# Patient Record
Sex: MALE | Race: BLACK OR AFRICAN AMERICAN | URBAN - METROPOLITAN AREA
[De-identification: names, ages, dates, MRNs, and addresses within clinical notes are randomized per-mention and may not be internally consistent; named-entity substitution may affect disease eponyms.]

---

## 2018-05-17 ENCOUNTER — HISTORICAL (OUTPATIENT)
Dept: ADMINISTRATIVE | Facility: HOSPITAL | Age: 78
End: 2018-05-17

## 2018-05-17 LAB
ABS NEUT (OLG): 5.43 X10(3)/MCL (ref 2.1–9.2)
ANION GAP SERPL CALC-SCNC: 14 MMOL/L
BUN SERPL-MCNC: 13 MG/DL (ref 7–18)
CHLORIDE SERPL-SCNC: 99 MMOL/L (ref 98–109)
CREAT SERPL-MCNC: 0.8 MG/DL (ref 0.6–1.3)
EOSINOPHIL # BLD AUTO: 0.1 X10(3)/MCL (ref 0–0.9)
EOSINOPHIL NFR BLD AUTO: 0.8 %
ERYTHROCYTE [DISTWIDTH] IN BLOOD BY AUTOMATED COUNT: 19.1 % (ref 11.5–17)
GLUCOSE SERPL-MCNC: 84 MG/DL (ref 70–105)
HCT VFR BLD AUTO: 35.7 % (ref 42–52)
HCT VFR BLD CALC: 36 % (ref 38–51)
HGB BLD-MCNC: 11.2 GM/DL (ref 14–18)
HGB BLD-MCNC: 12.2 MG/DL (ref 12–17)
LYMPHOCYTES # BLD AUTO: 1.2 X10(3)/MCL (ref 0.6–4.6)
LYMPHOCYTES NFR BLD AUTO: 15.9 %
MCH RBC QN AUTO: 24.9 PG (ref 27–31)
MCHC RBC AUTO-ENTMCNC: 31.4 GM/DL (ref 33–36)
MCV RBC AUTO: 79.5 FL (ref 80–94)
MONOCYTES # BLD AUTO: 0.9 X10(3)/MCL (ref 0.1–1.3)
MONOCYTES NFR BLD AUTO: 11.8 %
NEUTROPHILS # BLD AUTO: 5.4 X10(3)/MCL (ref 2.1–9.2)
NEUTROPHILS NFR BLD AUTO: 71.5 %
PLATELET # BLD AUTO: 369 X10(3)/MCL (ref 130–400)
PMV BLD AUTO: 9.4 FL (ref 9.4–12.4)
POC IONIZED CALCIUM: 1.25 MMOL/L (ref 1.12–1.32)
POC TCO2: 28 MMOL/L (ref 22–27)
POTASSIUM BLD-SCNC: 4.8 MMOL/L (ref 3.5–4.9)
RBC # BLD AUTO: 4.49 X10(6)/MCL (ref 4.7–6.1)
SODIUM BLD-SCNC: 135 MMOL/L (ref 138–146)
WBC # SPEC AUTO: 7.6 X10(3)/MCL (ref 4.5–11.5)

## 2018-05-23 ENCOUNTER — HISTORICAL (OUTPATIENT)
Dept: INFUSION THERAPY | Facility: HOSPITAL | Age: 78
End: 2018-05-23

## 2018-05-31 ENCOUNTER — HISTORICAL (OUTPATIENT)
Dept: ADMINISTRATIVE | Facility: HOSPITAL | Age: 78
End: 2018-05-31

## 2018-05-31 LAB
ABS NEUT (OLG): 5.03 X10(3)/MCL (ref 2.1–9.2)
ANION GAP SERPL CALC-SCNC: 13 MMOL/L
BASOPHILS # BLD AUTO: 0 X10(3)/MCL (ref 0–0.2)
BASOPHILS NFR BLD AUTO: 0.2 %
BUN SERPL-MCNC: 20 MG/DL (ref 7–18)
CHLORIDE SERPL-SCNC: 99 MMOL/L (ref 98–109)
CREAT SERPL-MCNC: 0.8 MG/DL (ref 0.6–1.3)
EOSINOPHIL # BLD AUTO: 0 X10(3)/MCL (ref 0–0.9)
EOSINOPHIL NFR BLD AUTO: 0.8 %
ERYTHROCYTE [DISTWIDTH] IN BLOOD BY AUTOMATED COUNT: 19.9 % (ref 11.5–17)
GLUCOSE SERPL-MCNC: 100 MG/DL (ref 70–105)
HCT VFR BLD AUTO: 34.6 % (ref 42–52)
HCT VFR BLD CALC: 35 % (ref 38–51)
HGB BLD-MCNC: 10.9 GM/DL (ref 14–18)
HGB BLD-MCNC: 11.9 MG/DL (ref 12–17)
LYMPHOCYTES # BLD AUTO: 0.4 X10(3)/MCL (ref 0.6–4.6)
LYMPHOCYTES NFR BLD AUTO: 7.3 %
MCH RBC QN AUTO: 25.5 PG (ref 27–31)
MCHC RBC AUTO-ENTMCNC: 31.5 GM/DL (ref 33–36)
MCV RBC AUTO: 81 FL (ref 80–94)
MONOCYTES # BLD AUTO: 0.5 X10(3)/MCL (ref 0.1–1.3)
MONOCYTES NFR BLD AUTO: 8.1 %
NEUTROPHILS # BLD AUTO: 5 X10(3)/MCL (ref 2.1–9.2)
NEUTROPHILS NFR BLD AUTO: 83.6 %
PLATELET # BLD AUTO: 385 X10(3)/MCL (ref 130–400)
PMV BLD AUTO: 8.6 FL (ref 9.4–12.4)
POC IONIZED CALCIUM: 1.27 MMOL/L (ref 1.12–1.32)
POC TCO2: 31 MMOL/L (ref 22–27)
POTASSIUM BLD-SCNC: 4.9 MMOL/L (ref 3.5–4.9)
RBC # BLD AUTO: 4.27 X10(6)/MCL (ref 4.7–6.1)
SODIUM BLD-SCNC: 136 MMOL/L (ref 138–146)
WBC # SPEC AUTO: 6 X10(3)/MCL (ref 4.5–11.5)

## 2018-06-01 ENCOUNTER — HISTORICAL (OUTPATIENT)
Dept: INFUSION THERAPY | Facility: HOSPITAL | Age: 78
End: 2018-06-01

## 2018-06-08 ENCOUNTER — HISTORICAL (OUTPATIENT)
Dept: INFUSION THERAPY | Facility: HOSPITAL | Age: 78
End: 2018-06-08

## 2018-06-08 LAB
ABS NEUT (OLG): 2.42 X10(3)/MCL (ref 2.1–9.2)
ALBUMIN SERPL-MCNC: 3.4 GM/DL (ref 3.4–5)
ALP SERPL-CCNC: 134 UNIT/L (ref 50–136)
ALT SERPL-CCNC: 27 UNIT/L (ref 12–78)
ANION GAP SERPL CALC-SCNC: 12 MMOL/L
AST SERPL-CCNC: 18 UNIT/L (ref 15–37)
BASOPHILS # BLD AUTO: 0 X10(3)/MCL (ref 0–0.2)
BASOPHILS NFR BLD AUTO: 0.3 %
BILIRUB SERPL-MCNC: 0.2 MG/DL (ref 0.2–1)
BILIRUBIN DIRECT+TOT PNL SERPL-MCNC: 0.1 MG/DL (ref 0–0.5)
BILIRUBIN DIRECT+TOT PNL SERPL-MCNC: 0.1 MG/DL (ref 0–0.8)
BUN SERPL-MCNC: 17 MG/DL (ref 7–18)
CHLORIDE SERPL-SCNC: 96 MMOL/L (ref 98–109)
CREAT SERPL-MCNC: 0.8 MG/DL (ref 0.6–1.3)
EOSINOPHIL # BLD AUTO: 0 X10(3)/MCL (ref 0–0.9)
EOSINOPHIL NFR BLD AUTO: 0.7 %
ERYTHROCYTE [DISTWIDTH] IN BLOOD BY AUTOMATED COUNT: 20.4 % (ref 11.5–17)
GLUCOSE SERPL-MCNC: 106 MG/DL (ref 70–105)
HCT VFR BLD AUTO: 34.4 % (ref 42–52)
HCT VFR BLD CALC: 35 % (ref 38–51)
HGB BLD-MCNC: 10.9 GM/DL (ref 14–18)
HGB BLD-MCNC: 11.9 MG/DL (ref 12–17)
LIVER PROFILE INTERP: NORMAL
LYMPHOCYTES # BLD AUTO: 0.2 X10(3)/MCL (ref 0.6–4.6)
LYMPHOCYTES NFR BLD AUTO: 5.5 %
MCH RBC QN AUTO: 25.5 PG (ref 27–31)
MCHC RBC AUTO-ENTMCNC: 31.7 GM/DL (ref 33–36)
MCV RBC AUTO: 80.4 FL (ref 80–94)
MONOCYTES # BLD AUTO: 0.3 X10(3)/MCL (ref 0.1–1.3)
MONOCYTES NFR BLD AUTO: 10.9 %
NEUTROPHILS # BLD AUTO: 2.4 X10(3)/MCL (ref 2.1–9.2)
NEUTROPHILS NFR BLD AUTO: 82.6 %
PLATELET # BLD AUTO: 239 X10(3)/MCL (ref 130–400)
PMV BLD AUTO: 9 FL (ref 9.4–12.4)
POC IONIZED CALCIUM: 1.22 MMOL/L (ref 1.12–1.32)
POC TCO2: 32 MMOL/L (ref 22–27)
POTASSIUM BLD-SCNC: 4.3 MMOL/L (ref 3.5–4.9)
PROT SERPL-MCNC: 7.6 GM/DL (ref 6.4–8.2)
RBC # BLD AUTO: 4.28 X10(6)/MCL (ref 4.7–6.1)
SODIUM BLD-SCNC: 135 MMOL/L (ref 138–146)
WBC # SPEC AUTO: 2.9 X10(3)/MCL (ref 4.5–11.5)

## 2018-06-09 ENCOUNTER — HISTORICAL (OUTPATIENT)
Dept: INFUSION THERAPY | Facility: HOSPITAL | Age: 78
End: 2018-06-09

## 2018-06-10 ENCOUNTER — HISTORICAL (OUTPATIENT)
Dept: INFUSION THERAPY | Facility: HOSPITAL | Age: 78
End: 2018-06-10

## 2018-06-14 ENCOUNTER — HISTORICAL (OUTPATIENT)
Dept: ADMINISTRATIVE | Facility: HOSPITAL | Age: 78
End: 2018-06-14

## 2018-06-14 LAB
ABS NEUT (OLG): 2.63 X10(3)/MCL (ref 2.1–9.2)
ANION GAP SERPL CALC-SCNC: 16 MMOL/L
BUN SERPL-MCNC: 12 MG/DL (ref 7–18)
CHLORIDE SERPL-SCNC: 96 MMOL/L (ref 98–109)
CREAT SERPL-MCNC: 0.8 MG/DL (ref 0.6–1.3)
EOSINOPHIL # BLD AUTO: 0 X10(3)/MCL (ref 0–0.9)
EOSINOPHIL NFR BLD AUTO: 0.3 %
ERYTHROCYTE [DISTWIDTH] IN BLOOD BY AUTOMATED COUNT: 21.5 % (ref 11.5–17)
GLUCOSE SERPL-MCNC: 109 MG/DL (ref 70–105)
HCT VFR BLD AUTO: 34.8 % (ref 42–52)
HCT VFR BLD CALC: 36 % (ref 38–51)
HGB BLD-MCNC: 11.2 GM/DL (ref 14–18)
HGB BLD-MCNC: 12.2 MG/DL (ref 12–17)
LYMPHOCYTES # BLD AUTO: 0.2 X10(3)/MCL (ref 0.6–4.6)
LYMPHOCYTES NFR BLD AUTO: 5.5 %
MCH RBC QN AUTO: 25.7 PG (ref 27–31)
MCHC RBC AUTO-ENTMCNC: 32.2 GM/DL (ref 33–36)
MCV RBC AUTO: 80 FL (ref 80–94)
MONOCYTES # BLD AUTO: 0.4 X10(3)/MCL (ref 0.1–1.3)
MONOCYTES NFR BLD AUTO: 13.8 %
NEUTROPHILS # BLD AUTO: 2.6 X10(3)/MCL (ref 2.1–9.2)
NEUTROPHILS NFR BLD AUTO: 80.4 %
PLATELET # BLD AUTO: 228 X10(3)/MCL (ref 130–400)
PMV BLD AUTO: 9 FL (ref 9.4–12.4)
POC IONIZED CALCIUM: 1.28 MMOL/L (ref 1.12–1.32)
POC TCO2: 27 MMOL/L (ref 22–27)
POTASSIUM BLD-SCNC: 4.3 MMOL/L (ref 3.5–4.9)
RBC # BLD AUTO: 4.35 X10(6)/MCL (ref 4.7–6.1)
SODIUM BLD-SCNC: 134 MMOL/L (ref 138–146)
WBC # SPEC AUTO: 3.3 X10(3)/MCL (ref 4.5–11.5)

## 2018-06-15 ENCOUNTER — HISTORICAL (OUTPATIENT)
Dept: INFUSION THERAPY | Facility: HOSPITAL | Age: 78
End: 2018-06-15

## 2018-06-16 ENCOUNTER — HISTORICAL (OUTPATIENT)
Dept: INFUSION THERAPY | Facility: HOSPITAL | Age: 78
End: 2018-06-16

## 2018-06-17 ENCOUNTER — HISTORICAL (OUTPATIENT)
Dept: INFUSION THERAPY | Facility: HOSPITAL | Age: 78
End: 2018-06-17

## 2018-06-22 ENCOUNTER — HISTORICAL (OUTPATIENT)
Dept: ADMINISTRATIVE | Facility: HOSPITAL | Age: 78
End: 2018-06-22

## 2018-06-22 LAB
ABS NEUT (OLG): 2 X10(3)/MCL (ref 2.1–9.2)
ALBUMIN SERPL-MCNC: 3.1 GM/DL (ref 3.4–5)
ALP SERPL-CCNC: 102 UNIT/L (ref 50–136)
ALT SERPL-CCNC: 21 UNIT/L (ref 12–78)
ANION GAP SERPL CALC-SCNC: 14 MMOL/L
AST SERPL-CCNC: 14 UNIT/L (ref 15–37)
BASOPHILS # BLD AUTO: 0 X10(3)/MCL (ref 0–0.2)
BASOPHILS NFR BLD AUTO: 0.4 %
BILIRUB SERPL-MCNC: 0.2 MG/DL (ref 0.2–1)
BILIRUBIN DIRECT+TOT PNL SERPL-MCNC: 0.1 MG/DL (ref 0–0.5)
BILIRUBIN DIRECT+TOT PNL SERPL-MCNC: 0.1 MG/DL (ref 0–0.8)
BUN SERPL-MCNC: 11 MG/DL (ref 7–18)
CHLORIDE SERPL-SCNC: 97 MMOL/L (ref 98–109)
CREAT SERPL-MCNC: 0.7 MG/DL (ref 0.6–1.3)
EOSINOPHIL # BLD AUTO: 0 X10(3)/MCL (ref 0–0.9)
EOSINOPHIL NFR BLD AUTO: 0.4 %
ERYTHROCYTE [DISTWIDTH] IN BLOOD BY AUTOMATED COUNT: 22.2 % (ref 11.5–17)
GLUCOSE SERPL-MCNC: 75 MG/DL (ref 70–105)
HCT VFR BLD AUTO: 32 % (ref 42–52)
HCT VFR BLD CALC: 32 % (ref 38–51)
HGB BLD-MCNC: 10.2 GM/DL (ref 14–18)
HGB BLD-MCNC: 10.9 MG/DL (ref 12–17)
LIVER PROFILE INTERP: ABNORMAL
LYMPHOCYTES # BLD AUTO: 0.2 X10(3)/MCL (ref 0.6–4.6)
LYMPHOCYTES NFR BLD AUTO: 6.6 %
MCH RBC QN AUTO: 25.8 PG (ref 27–31)
MCHC RBC AUTO-ENTMCNC: 31.9 GM/DL (ref 33–36)
MCV RBC AUTO: 81 FL (ref 80–94)
MONOCYTES # BLD AUTO: 0.5 X10(3)/MCL (ref 0.1–1.3)
MONOCYTES NFR BLD AUTO: 19.7 %
NEUTROPHILS # BLD AUTO: 2 X10(3)/MCL (ref 2.1–9.2)
NEUTROPHILS NFR BLD AUTO: 72.9 %
PLATELET # BLD AUTO: 279 X10(3)/MCL (ref 130–400)
PMV BLD AUTO: 9.4 FL (ref 9.4–12.4)
POC IONIZED CALCIUM: 1.25 MMOL/L (ref 1.12–1.32)
POC TCO2: 28 MMOL/L (ref 22–27)
POTASSIUM BLD-SCNC: 4.5 MMOL/L (ref 3.5–4.9)
PROT SERPL-MCNC: 7 GM/DL (ref 6.4–8.2)
RBC # BLD AUTO: 3.95 X10(6)/MCL (ref 4.7–6.1)
SODIUM BLD-SCNC: 134 MMOL/L (ref 138–146)
WBC # SPEC AUTO: 2.7 X10(3)/MCL (ref 4.5–11.5)

## 2018-06-23 ENCOUNTER — HISTORICAL (OUTPATIENT)
Dept: INFUSION THERAPY | Facility: HOSPITAL | Age: 78
End: 2018-06-23

## 2018-06-24 ENCOUNTER — HISTORICAL (OUTPATIENT)
Dept: INFUSION THERAPY | Facility: HOSPITAL | Age: 78
End: 2018-06-24

## 2018-06-28 ENCOUNTER — HISTORICAL (OUTPATIENT)
Dept: ADMINISTRATIVE | Facility: HOSPITAL | Age: 78
End: 2018-06-28

## 2018-06-28 LAB
ABS NEUT (OLG): 1.37 X10(3)/MCL (ref 2.1–9.2)
ALBUMIN SERPL-MCNC: 3 GM/DL (ref 3.4–5)
ALP SERPL-CCNC: 107 UNIT/L (ref 50–136)
ALT SERPL-CCNC: 21 UNIT/L (ref 12–78)
ANION GAP SERPL CALC-SCNC: 14 MMOL/L
AST SERPL-CCNC: 14 UNIT/L (ref 15–37)
BASOPHILS # BLD AUTO: 0 X10(3)/MCL (ref 0–0.2)
BASOPHILS NFR BLD AUTO: 0.5 %
BILIRUB SERPL-MCNC: 0.3 MG/DL (ref 0.2–1)
BILIRUBIN DIRECT+TOT PNL SERPL-MCNC: 0.1 MG/DL (ref 0–0.2)
BILIRUBIN DIRECT+TOT PNL SERPL-MCNC: 0.2 MG/DL (ref 0–0.8)
BUN SERPL-MCNC: 13 MG/DL (ref 8–26)
CHLORIDE SERPL-SCNC: 101 MMOL/L (ref 98–109)
CREAT SERPL-MCNC: 0.7 MG/DL (ref 0.6–1.3)
EOSINOPHIL # BLD AUTO: 0 X10(3)/MCL (ref 0–0.9)
EOSINOPHIL NFR BLD AUTO: 0.5 %
ERYTHROCYTE [DISTWIDTH] IN BLOOD BY AUTOMATED COUNT: 23.3 % (ref 11.5–17)
GLUCOSE SERPL-MCNC: 81 MG/DL (ref 70–105)
HCT VFR BLD AUTO: 32.3 % (ref 42–52)
HCT VFR BLD CALC: 31 % (ref 38–51)
HGB BLD-MCNC: 10.2 GM/DL (ref 14–18)
HGB BLD-MCNC: 10.5 MG/DL (ref 12–17)
LIVER PROFILE INTERP: ABNORMAL
LYMPHOCYTES # BLD AUTO: 0.2 X10(3)/MCL (ref 0.6–4.6)
LYMPHOCYTES NFR BLD AUTO: 7.7 %
LYMPHOCYTES NFR BLD MANUAL: 8 % (ref 13–40)
MCH RBC QN AUTO: 25.7 PG (ref 27–31)
MCHC RBC AUTO-ENTMCNC: 31.6 GM/DL (ref 33–36)
MCV RBC AUTO: 81.4 FL (ref 80–94)
METAMYELOCYTES NFR BLD MANUAL: 4 %
MONOCYTES # BLD AUTO: 0.6 X10(3)/MCL (ref 0.1–1.3)
MONOCYTES NFR BLD AUTO: 29.4 %
MONOCYTES NFR BLD MANUAL: 7 % (ref 2–11)
MYELOCYTES NFR BLD MANUAL: 1 %
NEUTROPHILS # BLD AUTO: 1.4 X10(3)/MCL (ref 2.1–9.2)
NEUTROPHILS NFR BLD AUTO: 61.9 %
NEUTROPHILS NFR BLD MANUAL: 80 % (ref 47–80)
PLATELET # BLD AUTO: 284 X10(3)/MCL (ref 130–400)
PMV BLD AUTO: 8.7 FL (ref 9.4–12.4)
POC IONIZED CALCIUM: 1.24 MMOL/L (ref 1.12–1.32)
POC TCO2: 28 MMOL/L (ref 24–29)
POTASSIUM BLD-SCNC: 5.6 MMOL/L (ref 3.5–4.9)
PROT SERPL-MCNC: 7.1 GM/DL (ref 6.4–8.2)
RBC # BLD AUTO: 3.97 X10(6)/MCL (ref 4.7–6.1)
SODIUM BLD-SCNC: 136 MMOL/L (ref 138–146)
WBC # SPEC AUTO: 2.2 X10(3)/MCL (ref 4.5–11.5)

## 2018-06-29 ENCOUNTER — HISTORICAL (OUTPATIENT)
Dept: INFUSION THERAPY | Facility: HOSPITAL | Age: 78
End: 2018-06-29

## 2018-06-30 ENCOUNTER — HISTORICAL (OUTPATIENT)
Dept: INFUSION THERAPY | Facility: HOSPITAL | Age: 78
End: 2018-06-30

## 2018-07-01 ENCOUNTER — HISTORICAL (OUTPATIENT)
Dept: INFUSION THERAPY | Facility: HOSPITAL | Age: 78
End: 2018-07-01

## 2018-07-02 ENCOUNTER — HISTORICAL (OUTPATIENT)
Dept: INFUSION THERAPY | Facility: HOSPITAL | Age: 78
End: 2018-07-02

## 2018-07-02 LAB
ABS NEUT (OLG): 8.07 X10(3)/MCL (ref 2.1–9.2)
ALBUMIN SERPL-MCNC: 2.6 GM/DL (ref 3.4–5)
ALBUMIN/GLOB SERPL: 0.8 {RATIO}
ALP SERPL-CCNC: 95 UNIT/L (ref 50–136)
ALT SERPL-CCNC: 15 UNIT/L (ref 12–78)
AST SERPL-CCNC: 11 UNIT/L (ref 15–37)
BASOPHILS # BLD AUTO: 0 X10(3)/MCL (ref 0–0.2)
BASOPHILS NFR BLD AUTO: 0.1 %
BILIRUB SERPL-MCNC: 0.4 MG/DL (ref 0.2–1)
BILIRUBIN DIRECT+TOT PNL SERPL-MCNC: 0.1 MG/DL (ref 0–0.2)
BILIRUBIN DIRECT+TOT PNL SERPL-MCNC: 0.3 MG/DL (ref 0–0.8)
BUN SERPL-MCNC: 9 MG/DL (ref 7–18)
CALCIUM SERPL-MCNC: 8.1 MG/DL (ref 8.5–10.1)
CHLORIDE SERPL-SCNC: 100 MMOL/L (ref 98–107)
CO2 SERPL-SCNC: 26 MMOL/L (ref 21–32)
CREAT SERPL-MCNC: 0.55 MG/DL (ref 0.7–1.3)
ERYTHROCYTE [DISTWIDTH] IN BLOOD BY AUTOMATED COUNT: 24.1 % (ref 11.5–17)
GLOBULIN SER-MCNC: 3.4 GM/DL (ref 2.4–3.5)
GLUCOSE SERPL-MCNC: 72 MG/DL (ref 74–106)
HCT VFR BLD AUTO: 28.6 % (ref 42–52)
HGB BLD-MCNC: 9.1 GM/DL (ref 14–18)
LYMPHOCYTES # BLD AUTO: 0.2 X10(3)/MCL (ref 0.6–4.6)
LYMPHOCYTES NFR BLD AUTO: 2.8 %
MCH RBC QN AUTO: 26.1 PG (ref 27–31)
MCHC RBC AUTO-ENTMCNC: 31.8 GM/DL (ref 33–36)
MCV RBC AUTO: 81.9 FL (ref 80–94)
MONOCYTES # BLD AUTO: 0.3 X10(3)/MCL (ref 0.1–1.3)
MONOCYTES NFR BLD AUTO: 3.9 %
NEUTROPHILS # BLD AUTO: 8.1 X10(3)/MCL (ref 2.1–9.2)
NEUTROPHILS NFR BLD AUTO: 93.2 %
PLATELET # BLD AUTO: 291 X10(3)/MCL (ref 130–400)
PMV BLD AUTO: 9.1 FL (ref 9.4–12.4)
POTASSIUM SERPL-SCNC: 4.5 MMOL/L (ref 3.5–5.1)
PROT SERPL-MCNC: 6 GM/DL (ref 6.4–8.2)
RBC # BLD AUTO: 3.49 X10(6)/MCL (ref 4.7–6.1)
SODIUM SERPL-SCNC: 134 MMOL/L (ref 136–145)
WBC # SPEC AUTO: 8.7 X10(3)/MCL (ref 4.5–11.5)

## 2018-07-05 ENCOUNTER — HISTORICAL (OUTPATIENT)
Dept: ADMINISTRATIVE | Facility: HOSPITAL | Age: 78
End: 2018-07-05

## 2018-07-05 LAB
ABS NEUT (OLG): 1.81 X10(3)/MCL (ref 2.1–9.2)
ANION GAP SERPL CALC-SCNC: 20 MMOL/L
ANISOCYTOSIS BLD QL SMEAR: 1
BASOPHILS # BLD AUTO: 0 X10(3)/MCL (ref 0–0.2)
BASOPHILS NFR BLD AUTO: 0.3 %
BUN SERPL-MCNC: 10 MG/DL (ref 8–26)
CHLORIDE SERPL-SCNC: 98 MMOL/L (ref 98–109)
CREAT SERPL-MCNC: 0.6 MG/DL (ref 0.6–1.3)
EOSINOPHIL # BLD AUTO: 0 X10(3)/MCL (ref 0–0.9)
EOSINOPHIL NFR BLD AUTO: 0.3 %
ERYTHROCYTE [DISTWIDTH] IN BLOOD BY AUTOMATED COUNT: 24.6 % (ref 11.5–17)
GLUCOSE SERPL-MCNC: 93 MG/DL (ref 70–105)
HCT VFR BLD AUTO: 32.2 % (ref 42–52)
HCT VFR BLD CALC: 30 % (ref 38–51)
HGB BLD-MCNC: 10.2 MG/DL (ref 12–17)
HGB BLD-MCNC: 10.4 GM/DL (ref 14–18)
HYPOCHROMIA BLD QL SMEAR: 0
LYMPHOCYTES # BLD AUTO: 0.4 X10(3)/MCL (ref 0.6–4.6)
LYMPHOCYTES NFR BLD AUTO: 13.7 %
LYMPHOCYTES NFR BLD MANUAL: 17 % (ref 13–40)
MACROCYTES BLD QL SMEAR: 1
MCH RBC QN AUTO: 26.5 PG (ref 27–31)
MCHC RBC AUTO-ENTMCNC: 32.3 GM/DL (ref 33–36)
MCV RBC AUTO: 81.9 FL (ref 80–94)
MICROCYTES BLD QL SMEAR: 0
MONOCYTES # BLD AUTO: 0.7 X10(3)/MCL (ref 0.1–1.3)
MONOCYTES NFR BLD AUTO: 23.4 %
MONOCYTES NFR BLD MANUAL: 14 % (ref 2–11)
NEUTROPHILS # BLD AUTO: 1.8 X10(3)/MCL (ref 2.1–9.2)
NEUTROPHILS NFR BLD AUTO: 62.3 %
NEUTROPHILS NFR BLD MANUAL: 69 % (ref 47–80)
PLATELET # BLD AUTO: 305 X10(3)/MCL (ref 130–400)
PLATELET # BLD EST: NORMAL 10*3/UL
PMV BLD AUTO: 9.1 FL (ref 9.4–12.4)
POC IONIZED CALCIUM: 1.2 MMOL/L (ref 1.12–1.32)
POC TCO2: 25 MMOL/L (ref 24–29)
POIKILOCYTOSIS BLD QL SMEAR: 1
POLYCHROMASIA BLD QL SMEAR: 0
POTASSIUM BLD-SCNC: 3.8 MMOL/L (ref 3.5–4.9)
RBC # BLD AUTO: 3.93 X10(6)/MCL (ref 4.7–6.1)
SODIUM BLD-SCNC: 137 MMOL/L (ref 138–146)
WBC # SPEC AUTO: 2.9 X10(3)/MCL (ref 4.5–11.5)

## 2018-07-06 ENCOUNTER — HISTORICAL (OUTPATIENT)
Dept: INFUSION THERAPY | Facility: HOSPITAL | Age: 78
End: 2018-07-06

## 2018-07-07 ENCOUNTER — HISTORICAL (OUTPATIENT)
Dept: INFUSION THERAPY | Facility: HOSPITAL | Age: 78
End: 2018-07-07

## 2018-07-10 ENCOUNTER — HISTORICAL (OUTPATIENT)
Dept: INFUSION THERAPY | Facility: HOSPITAL | Age: 78
End: 2018-07-10

## 2018-07-12 ENCOUNTER — HISTORICAL (OUTPATIENT)
Dept: INFUSION THERAPY | Facility: HOSPITAL | Age: 78
End: 2018-07-12

## 2018-07-12 LAB
ABS NEUT (OLG): 2.19 X10(3)/MCL (ref 2.1–9.2)
ALBUMIN SERPL-MCNC: 2.9 GM/DL (ref 3.4–5)
ALBUMIN/GLOB SERPL: 0.7 RATIO (ref 1.1–2)
ALP SERPL-CCNC: 104 UNIT/L (ref 50–136)
ALT SERPL-CCNC: 16 UNIT/L (ref 12–78)
AST SERPL-CCNC: 14 UNIT/L (ref 15–37)
BASOPHILS # BLD AUTO: 0 X10(3)/MCL (ref 0–0.2)
BASOPHILS NFR BLD AUTO: 0.2 %
BILIRUB SERPL-MCNC: 0.2 MG/DL (ref 0.2–1)
BILIRUBIN DIRECT+TOT PNL SERPL-MCNC: 0.1 MG/DL (ref 0–0.5)
BILIRUBIN DIRECT+TOT PNL SERPL-MCNC: 0.1 MG/DL (ref 0–0.8)
BUN SERPL-MCNC: 13 MG/DL (ref 7–18)
CALCIUM SERPL-MCNC: 9.1 MG/DL (ref 8.5–10.1)
CHLORIDE SERPL-SCNC: 101 MMOL/L (ref 98–107)
CO2 SERPL-SCNC: 29 MMOL/L (ref 21–32)
CREAT SERPL-MCNC: 0.8 MG/DL (ref 0.7–1.3)
EOSINOPHIL # BLD AUTO: 0 X10(3)/MCL (ref 0–0.9)
EOSINOPHIL NFR BLD AUTO: 0.2 %
ERYTHROCYTE [DISTWIDTH] IN BLOOD BY AUTOMATED COUNT: 25.8 % (ref 11.5–17)
GLOBULIN SER-MCNC: 3.9 GM/DL (ref 2.4–3.5)
GLUCOSE SERPL-MCNC: 102 MG/DL (ref 74–106)
HCT VFR BLD AUTO: 33.9 % (ref 42–52)
HGB BLD-MCNC: 10.8 GM/DL (ref 14–18)
LYMPHOCYTES # BLD AUTO: 1 X10(3)/MCL (ref 0.6–4.6)
LYMPHOCYTES NFR BLD AUTO: 24.7 %
MCH RBC QN AUTO: 25.8 PG (ref 27–31)
MCHC RBC AUTO-ENTMCNC: 31.9 GM/DL (ref 33–36)
MCV RBC AUTO: 81.1 FL (ref 80–94)
MONOCYTES # BLD AUTO: 0.9 X10(3)/MCL (ref 0.1–1.3)
MONOCYTES NFR BLD AUTO: 22.3 %
NEUTROPHILS # BLD AUTO: 2.2 X10(3)/MCL (ref 2.1–9.2)
NEUTROPHILS NFR BLD AUTO: 52.6 %
PLATELET # BLD AUTO: 306 X10(3)/MCL (ref 130–400)
PMV BLD AUTO: 8.7 FL (ref 9.4–12.4)
POTASSIUM SERPL-SCNC: 4.2 MMOL/L (ref 3.5–5.1)
PROT SERPL-MCNC: 6.8 GM/DL (ref 6.4–8.2)
RBC # BLD AUTO: 4.18 X10(6)/MCL (ref 4.7–6.1)
SODIUM SERPL-SCNC: 138 MMOL/L (ref 136–145)
WBC # SPEC AUTO: 4.2 X10(3)/MCL (ref 4.5–11.5)

## 2018-07-18 ENCOUNTER — HISTORICAL (OUTPATIENT)
Dept: INFUSION THERAPY | Facility: HOSPITAL | Age: 78
End: 2018-07-18

## 2018-07-18 LAB
ABS NEUT (OLG): 2.96 X10(3)/MCL (ref 2.1–9.2)
ALBUMIN SERPL-MCNC: 2.9 GM/DL (ref 3.4–5)
ALBUMIN/GLOB SERPL: 0.7 {RATIO}
ALP SERPL-CCNC: 95 UNIT/L (ref 50–136)
ALT SERPL-CCNC: 19 UNIT/L (ref 12–78)
AST SERPL-CCNC: 22 UNIT/L (ref 15–37)
BASOPHILS # BLD AUTO: 0 X10(3)/MCL (ref 0–0.2)
BASOPHILS NFR BLD AUTO: 0.2 %
BILIRUB SERPL-MCNC: 0.3 MG/DL (ref 0.2–1)
BILIRUBIN DIRECT+TOT PNL SERPL-MCNC: 0.1 MG/DL (ref 0–0.2)
BILIRUBIN DIRECT+TOT PNL SERPL-MCNC: 0.2 MG/DL (ref 0–0.8)
BUN SERPL-MCNC: 14 MG/DL (ref 7–18)
CALCIUM SERPL-MCNC: 8.8 MG/DL (ref 8.5–10.1)
CHLORIDE SERPL-SCNC: 103 MMOL/L (ref 98–107)
CO2 SERPL-SCNC: 29 MMOL/L (ref 21–32)
CREAT SERPL-MCNC: 0.7 MG/DL (ref 0.7–1.3)
EOSINOPHIL # BLD AUTO: 0 X10(3)/MCL (ref 0–0.9)
EOSINOPHIL NFR BLD AUTO: 0.2 %
ERYTHROCYTE [DISTWIDTH] IN BLOOD BY AUTOMATED COUNT: 26 % (ref 11.5–17)
GLOBULIN SER-MCNC: 4.1 GM/DL (ref 2.4–3.5)
GLUCOSE SERPL-MCNC: 84 MG/DL (ref 74–106)
HCT VFR BLD AUTO: 31.6 % (ref 42–52)
HGB BLD-MCNC: 10.1 GM/DL (ref 14–18)
LYMPHOCYTES # BLD AUTO: 1.4 X10(3)/MCL (ref 0.6–4.6)
LYMPHOCYTES NFR BLD AUTO: 28 %
MCH RBC QN AUTO: 26 PG (ref 27–31)
MCHC RBC AUTO-ENTMCNC: 32 GM/DL (ref 33–36)
MCV RBC AUTO: 81.4 FL (ref 80–94)
MONOCYTES # BLD AUTO: 0.6 X10(3)/MCL (ref 0.1–1.3)
MONOCYTES NFR BLD AUTO: 12.9 %
NEUTROPHILS # BLD AUTO: 3 X10(3)/MCL (ref 2.1–9.2)
NEUTROPHILS NFR BLD AUTO: 58.7 %
PLATELET # BLD AUTO: 262 X10(3)/MCL (ref 130–400)
PMV BLD AUTO: 7.8 FL (ref 9.4–12.4)
POTASSIUM SERPL-SCNC: 4.5 MMOL/L (ref 3.5–5.1)
PROT SERPL-MCNC: 7 GM/DL (ref 6.4–8.2)
RBC # BLD AUTO: 3.88 X10(6)/MCL (ref 4.7–6.1)
SODIUM SERPL-SCNC: 139 MMOL/L (ref 136–145)
WBC # SPEC AUTO: 5 X10(3)/MCL (ref 4.5–11.5)

## 2018-07-26 ENCOUNTER — HISTORICAL (OUTPATIENT)
Dept: ADMINISTRATIVE | Facility: HOSPITAL | Age: 78
End: 2018-07-26

## 2018-07-26 LAB
ABS NEUT (OLG): 2.13 X10(3)/MCL (ref 2.1–9.2)
ANION GAP SERPL CALC-SCNC: 15 MMOL/L
BASOPHILS # BLD AUTO: 0 X10(3)/MCL (ref 0–0.2)
BASOPHILS NFR BLD AUTO: 0.2 %
BUN SERPL-MCNC: 15 MG/DL (ref 8–26)
CHLORIDE SERPL-SCNC: 99 MMOL/L (ref 98–109)
CREAT SERPL-MCNC: 0.8 MG/DL (ref 0.6–1.3)
EOSINOPHIL # BLD AUTO: 0.1 X10(3)/MCL (ref 0–0.9)
EOSINOPHIL NFR BLD AUTO: 1.9 %
ERYTHROCYTE [DISTWIDTH] IN BLOOD BY AUTOMATED COUNT: 25.2 % (ref 11.5–17)
GLUCOSE SERPL-MCNC: 92 MG/DL (ref 70–105)
HCT VFR BLD AUTO: 31.7 % (ref 42–52)
HCT VFR BLD CALC: 32 % (ref 38–51)
HGB BLD-MCNC: 10 GM/DL (ref 14–18)
HGB BLD-MCNC: 10.9 MG/DL (ref 12–17)
LYMPHOCYTES # BLD AUTO: 1.3 X10(3)/MCL (ref 0.6–4.6)
LYMPHOCYTES NFR BLD AUTO: 30.1 %
MCH RBC QN AUTO: 26 PG (ref 27–31)
MCHC RBC AUTO-ENTMCNC: 31.5 GM/DL (ref 33–36)
MCV RBC AUTO: 82.3 FL (ref 80–94)
MONOCYTES # BLD AUTO: 0.8 X10(3)/MCL (ref 0.1–1.3)
MONOCYTES NFR BLD AUTO: 17.6 %
NEUTROPHILS # BLD AUTO: 2.1 X10(3)/MCL (ref 2.1–9.2)
NEUTROPHILS NFR BLD AUTO: 50.2 %
PLATELET # BLD AUTO: 246 X10(3)/MCL (ref 130–400)
PMV BLD AUTO: 8.2 FL (ref 9.4–12.4)
POC IONIZED CALCIUM: 1.21 MMOL/L (ref 1.12–1.32)
POC TCO2: 29 MMOL/L (ref 24–29)
POTASSIUM BLD-SCNC: 3.8 MMOL/L (ref 3.5–4.9)
RBC # BLD AUTO: 3.85 X10(6)/MCL (ref 4.7–6.1)
SODIUM BLD-SCNC: 138 MMOL/L (ref 138–146)
WBC # SPEC AUTO: 4.2 X10(3)/MCL (ref 4.5–11.5)

## 2018-08-20 ENCOUNTER — HISTORICAL (OUTPATIENT)
Dept: HEMATOLOGY/ONCOLOGY | Facility: CLINIC | Age: 78
End: 2018-08-20

## 2018-08-20 LAB
ABS NEUT (OLG): 2.5 X10(3)/MCL (ref 2.1–9.2)
ALBUMIN SERPL-MCNC: 2.3 GM/DL (ref 3.4–5)
ALBUMIN/GLOB SERPL: 0.5 {RATIO}
ALP SERPL-CCNC: 94 UNIT/L (ref 50–136)
ALT SERPL-CCNC: 19 UNIT/L (ref 12–78)
AST SERPL-CCNC: 15 UNIT/L (ref 15–37)
BILIRUB SERPL-MCNC: 0.2 MG/DL (ref 0.2–1)
BILIRUBIN DIRECT+TOT PNL SERPL-MCNC: 0.1 MG/DL (ref 0–0.2)
BILIRUBIN DIRECT+TOT PNL SERPL-MCNC: 0.1 MG/DL (ref 0–0.8)
BUN SERPL-MCNC: 20 MG/DL (ref 7–18)
CALCIUM SERPL-MCNC: 8.7 MG/DL (ref 8.5–10.1)
CHLORIDE SERPL-SCNC: 106 MMOL/L (ref 98–107)
CO2 SERPL-SCNC: 28 MMOL/L (ref 21–32)
CREAT SERPL-MCNC: 0.72 MG/DL (ref 0.7–1.3)
EOSINOPHIL # BLD AUTO: 0.2 X10(3)/MCL (ref 0–0.9)
EOSINOPHIL NFR BLD AUTO: 5.4 %
ERYTHROCYTE [DISTWIDTH] IN BLOOD BY AUTOMATED COUNT: 22.2 % (ref 11.5–17)
GLOBULIN SER-MCNC: 4.5 GM/DL (ref 2.4–3.5)
GLUCOSE SERPL-MCNC: 107 MG/DL (ref 74–106)
HCT VFR BLD AUTO: 28.8 % (ref 42–52)
HGB BLD-MCNC: 8.9 GM/DL (ref 14–18)
LYMPHOCYTES # BLD AUTO: 0.7 X10(3)/MCL (ref 0.6–4.6)
LYMPHOCYTES NFR BLD AUTO: 17.7 %
MCH RBC QN AUTO: 25.5 PG (ref 27–31)
MCHC RBC AUTO-ENTMCNC: 30.9 GM/DL (ref 33–36)
MCV RBC AUTO: 82.5 FL (ref 80–94)
MONOCYTES # BLD AUTO: 0.5 X10(3)/MCL (ref 0.1–1.3)
MONOCYTES NFR BLD AUTO: 12.6 %
NEUTROPHILS # BLD AUTO: 2.5 X10(3)/MCL (ref 2.1–9.2)
NEUTROPHILS NFR BLD AUTO: 64.3 %
PLATELET # BLD AUTO: 492 X10(3)/MCL (ref 130–400)
PMV BLD AUTO: 8.4 FL (ref 9.4–12.4)
POTASSIUM SERPL-SCNC: 4.2 MMOL/L (ref 3.5–5.1)
PROT SERPL-MCNC: 6.8 GM/DL (ref 6.4–8.2)
RBC # BLD AUTO: 3.49 X10(6)/MCL (ref 4.7–6.1)
SODIUM SERPL-SCNC: 139 MMOL/L (ref 136–145)
WBC # SPEC AUTO: 3.9 X10(3)/MCL (ref 4.5–11.5)

## 2018-09-29 ENCOUNTER — HOSPITAL ENCOUNTER (OUTPATIENT)
Dept: MEDSURG UNIT | Facility: HOSPITAL | Age: 78
End: 2018-10-02
Attending: INTERNAL MEDICINE | Admitting: INTERNAL MEDICINE

## 2018-09-29 LAB
ABS NEUT (OLG): 5.79 X10(3)/MCL (ref 2.1–9.2)
ALBUMIN SERPL-MCNC: 2.4 GM/DL (ref 3.4–5)
ALBUMIN/GLOB SERPL: 0.5 {RATIO}
ALP SERPL-CCNC: 124 UNIT/L (ref 50–136)
ALT SERPL-CCNC: 17 UNIT/L (ref 12–78)
APTT PPP: 32.3 SECOND(S) (ref 24.8–36.9)
AST SERPL-CCNC: 19 UNIT/L (ref 15–37)
BASOPHILS # BLD AUTO: 0 X10(3)/MCL (ref 0–0.2)
BASOPHILS NFR BLD AUTO: 0 %
BILIRUB SERPL-MCNC: 0.3 MG/DL (ref 0.2–1)
BILIRUBIN DIRECT+TOT PNL SERPL-MCNC: 0.1 MG/DL (ref 0–0.2)
BILIRUBIN DIRECT+TOT PNL SERPL-MCNC: 0.2 MG/DL (ref 0–0.8)
BUN SERPL-MCNC: 30 MG/DL (ref 7–18)
CALCIUM SERPL-MCNC: 9.4 MG/DL (ref 8.5–10.1)
CHLORIDE SERPL-SCNC: 105 MMOL/L (ref 98–107)
CO2 SERPL-SCNC: 28 MMOL/L (ref 21–32)
CREAT SERPL-MCNC: 1.21 MG/DL (ref 0.7–1.3)
CRP SERPL HS-MCNC: 61.6 MG/L (ref 0–3)
EOSINOPHIL # BLD AUTO: 0 X10(3)/MCL (ref 0–0.9)
EOSINOPHIL NFR BLD AUTO: 0 %
ERYTHROCYTE [DISTWIDTH] IN BLOOD BY AUTOMATED COUNT: 19 % (ref 11.5–17)
ERYTHROCYTE [SEDIMENTATION RATE] IN BLOOD: 82 MM/HR (ref 0–15)
GLOBULIN SER-MCNC: 5.3 GM/DL (ref 2.4–3.5)
GLUCOSE SERPL-MCNC: 145 MG/DL (ref 74–106)
HCT VFR BLD AUTO: 32.2 % (ref 42–52)
HGB BLD-MCNC: 9.2 GM/DL (ref 14–18)
INR PPP: 1.23 (ref 0–1.27)
LYMPHOCYTES # BLD AUTO: 0.9 X10(3)/MCL (ref 0.6–4.6)
LYMPHOCYTES NFR BLD AUTO: 12 %
MCH RBC QN AUTO: 23.6 PG (ref 27–31)
MCHC RBC AUTO-ENTMCNC: 28.6 GM/DL (ref 33–36)
MCV RBC AUTO: 82.6 FL (ref 80–94)
MONOCYTES # BLD AUTO: 0.6 X10(3)/MCL (ref 0.1–1.3)
MONOCYTES NFR BLD AUTO: 8 %
NEUTROPHILS # BLD AUTO: 5.79 X10(3)/MCL (ref 2.1–9.2)
NEUTROPHILS NFR BLD AUTO: 79 %
PLATELET # BLD AUTO: 256 X10(3)/MCL (ref 130–400)
PMV BLD AUTO: 9.2 FL (ref 9.4–12.4)
POTASSIUM SERPL-SCNC: 4.6 MMOL/L (ref 3.5–5.1)
PROT SERPL-MCNC: 7.7 GM/DL (ref 6.4–8.2)
PROTHROMBIN TIME: 15.9 SECOND(S) (ref 12.2–14.7)
RBC # BLD AUTO: 3.9 X10(6)/MCL (ref 4.7–6.1)
SODIUM SERPL-SCNC: 140 MMOL/L (ref 136–145)
WBC # SPEC AUTO: 7.3 X10(3)/MCL (ref 4.5–11.5)

## 2018-09-30 LAB
ABS NEUT (OLG): 4.43 X10(3)/MCL (ref 2.1–9.2)
ALBUMIN SERPL-MCNC: 2.2 GM/DL (ref 3.4–5)
ALBUMIN/GLOB SERPL: 0.5 RATIO (ref 1.1–2)
ALP SERPL-CCNC: 115 UNIT/L (ref 50–136)
ALT SERPL-CCNC: 17 UNIT/L (ref 12–78)
AST SERPL-CCNC: 24 UNIT/L (ref 15–37)
BASOPHILS # BLD AUTO: 0 X10(3)/MCL (ref 0–0.2)
BASOPHILS NFR BLD AUTO: 0 %
BILIRUB SERPL-MCNC: 0.3 MG/DL (ref 0.2–1)
BILIRUBIN DIRECT+TOT PNL SERPL-MCNC: 0.1 MG/DL (ref 0–0.5)
BILIRUBIN DIRECT+TOT PNL SERPL-MCNC: 0.2 MG/DL (ref 0–0.8)
BUN SERPL-MCNC: 26 MG/DL (ref 7–18)
CALCIUM SERPL-MCNC: 9.3 MG/DL (ref 8.5–10.1)
CHLORIDE SERPL-SCNC: 105 MMOL/L (ref 98–107)
CO2 SERPL-SCNC: 28 MMOL/L (ref 21–32)
CREAT SERPL-MCNC: 0.8 MG/DL (ref 0.7–1.3)
EOSINOPHIL # BLD AUTO: 0 X10(3)/MCL (ref 0–0.9)
EOSINOPHIL NFR BLD AUTO: 0 %
ERYTHROCYTE [DISTWIDTH] IN BLOOD BY AUTOMATED COUNT: 18.9 % (ref 11.5–17)
GLOBULIN SER-MCNC: 4.6 GM/DL (ref 2.4–3.5)
GLUCOSE SERPL-MCNC: 66 MG/DL (ref 74–106)
HCT VFR BLD AUTO: 30.6 % (ref 42–52)
HGB BLD-MCNC: 8.7 GM/DL (ref 14–18)
LYMPHOCYTES # BLD AUTO: 0.5 X10(3)/MCL (ref 0.6–4.6)
LYMPHOCYTES NFR BLD AUTO: 9 %
MCH RBC QN AUTO: 23.7 PG (ref 27–31)
MCHC RBC AUTO-ENTMCNC: 28.4 GM/DL (ref 33–36)
MCV RBC AUTO: 83.4 FL (ref 80–94)
MONOCYTES # BLD AUTO: 0.5 X10(3)/MCL (ref 0.1–1.3)
MONOCYTES NFR BLD AUTO: 9 %
NEUTROPHILS # BLD AUTO: 4.43 X10(3)/MCL (ref 2.1–9.2)
NEUTROPHILS NFR BLD AUTO: 81 %
PLATELET # BLD AUTO: 254 X10(3)/MCL (ref 130–400)
PMV BLD AUTO: 9.5 FL (ref 9.4–12.4)
POTASSIUM SERPL-SCNC: 4.8 MMOL/L (ref 3.5–5.1)
PROT SERPL-MCNC: 6.8 GM/DL (ref 6.4–8.2)
RBC # BLD AUTO: 3.67 X10(6)/MCL (ref 4.7–6.1)
SODIUM SERPL-SCNC: 138 MMOL/L (ref 136–145)
WBC # SPEC AUTO: 5.5 X10(3)/MCL (ref 4.5–11.5)

## 2018-10-01 LAB
ABS NEUT (OLG): 7.05 X10(3)/MCL (ref 2.1–9.2)
ALBUMIN SERPL-MCNC: 2.4 GM/DL (ref 3.4–5)
ALBUMIN/GLOB SERPL: 0.5 RATIO (ref 1.1–2)
ALP SERPL-CCNC: 151 UNIT/L (ref 50–136)
ALT SERPL-CCNC: 19 UNIT/L (ref 12–78)
AST SERPL-CCNC: 27 UNIT/L (ref 15–37)
BILIRUB SERPL-MCNC: 0.4 MG/DL (ref 0.2–1)
BILIRUBIN DIRECT+TOT PNL SERPL-MCNC: 0.2 MG/DL (ref 0–0.5)
BILIRUBIN DIRECT+TOT PNL SERPL-MCNC: 0.2 MG/DL (ref 0–0.8)
BUN SERPL-MCNC: 22 MG/DL (ref 7–18)
CALCIUM SERPL-MCNC: 9 MG/DL (ref 8.5–10.1)
CHLORIDE SERPL-SCNC: 101 MMOL/L (ref 98–107)
CO2 SERPL-SCNC: 28 MMOL/L (ref 21–32)
CREAT SERPL-MCNC: 0.92 MG/DL (ref 0.7–1.3)
ERYTHROCYTE [DISTWIDTH] IN BLOOD BY AUTOMATED COUNT: 19.2 % (ref 11.5–17)
GLOBULIN SER-MCNC: 4.7 GM/DL (ref 2.4–3.5)
GLUCOSE SERPL-MCNC: 91 MG/DL (ref 74–106)
HCT VFR BLD AUTO: 34.3 % (ref 42–52)
HGB BLD-MCNC: 9.9 GM/DL (ref 14–18)
INR PPP: 1.12 (ref 0–1.27)
LYMPHOCYTES # BLD AUTO: 0.3 X10(3)/MCL (ref 0.6–4.6)
LYMPHOCYTES NFR BLD AUTO: 4 %
MAGNESIUM SERPL-MCNC: 2.3 MG/DL (ref 1.8–2.4)
MCH RBC QN AUTO: 23.9 PG (ref 27–31)
MCHC RBC AUTO-ENTMCNC: 28.9 GM/DL (ref 33–36)
MCV RBC AUTO: 82.7 FL (ref 80–94)
MONOCYTES # BLD AUTO: 0.4 X10(3)/MCL (ref 0.1–1.3)
MONOCYTES NFR BLD AUTO: 5 %
NEUTROPHILS # BLD AUTO: 7.05 X10(3)/MCL (ref 2.1–9.2)
NEUTROPHILS NFR BLD AUTO: 90 %
PLATELET # BLD AUTO: 264 X10(3)/MCL (ref 130–400)
PMV BLD AUTO: 9.2 FL (ref 9.4–12.4)
POTASSIUM SERPL-SCNC: 5.1 MMOL/L (ref 3.5–5.1)
PROT SERPL-MCNC: 7.1 GM/DL (ref 6.4–8.2)
PROTHROMBIN TIME: 14.8 SECOND(S) (ref 12.2–14.7)
RBC # BLD AUTO: 4.15 X10(6)/MCL (ref 4.7–6.1)
SODIUM SERPL-SCNC: 136 MMOL/L (ref 136–145)
TSH SERPL-ACNC: 1.5 MIU/L (ref 0.36–3.74)
WBC # SPEC AUTO: 7.8 X10(3)/MCL (ref 4.5–11.5)

## 2022-04-30 NOTE — ED PROVIDER NOTES
Patient:   Perry Dalton            MRN: [id number]            FIN: [id number]               Age:   78 years     Sex:  Male     :  1940   Associated Diagnoses:   DVT of axillary vein, acute right   Author:   Cydney ENGLISH, Jamal CATALAN      Basic Information   Time seen: Date & time 2018 19:15:00.   History source: Patient.   Arrival mode: Private vehicle.   History limitation: None.   Additional information: Patient's physician(s): Mikki ENGLISH, Ion Vilchis MD , Sanjay Lagos MD, Atif RAYA.      History of Present Illness   The patient presents with right, arm swelling, Pt presents with right arm and hand swelling since this afternoon. Denies injury or trauma. Denies pain. Reports history of DVT in right shoulder and is currently on Eliquis. ANGLE Cantu,     I, Dr. Lamas, assumed care of this patient at .  78 year old male with history of HTN, non-small cell carcinoma to right lung with mets, and previous blood clot to the RUE 5 months ago on Eliquis presents to the ED complaining of swelling to the right arm and hand since this afternoon. He denies fever. Patient reports prior blood clot to the RUE in the beginning of March and being placed on Eliquis at the time. He reports that he has been taking it as prescribed. Patient reports that he does not ambulate much lately and usually sits with arms across his abdomen. Patient is not currently undergoing chemo or radiation due to finishing treatment and has appointment with Dr. Lemon on 10/5 to see if more treatment is needed. He is on pain medication at home..  The onset was This afternoon .  The course/duration of symptoms is constant.  Type of injury: none.  Location: Right arm forearm. Radiating pain: none. The character of symptoms is swelling.  The degree of pain is none.  The degree of swelling is moderate.  The exacerbating factor is none.  The relieving factor is none.  Risk factors consist of Previous blood clot to RUE.   Prior episodes: Previous blood clot to RUE in May.  Therapy today: none.  Associated symptoms: none.        Review of Systems   Constitutional symptoms:  No fever,    Skin symptoms:  Negative except as documented in HPI.   Eye symptoms:  Negative except as documented in HPI.   ENMT symptoms:  Negative except as documented in HPI.   Respiratory symptoms:  Negative except as documented in HPI.   Cardiovascular symptoms:  Negative except as documented in HPI.   Gastrointestinal symptoms:  Negative except as documented in HPI.   Genitourinary symptoms:  Negative except as documented in HPI.   Musculoskeletal symptoms:  Swelling to right arm and right hand.   Neurologic symptoms:  Negative except as documented in HPI.      Health Status   Allergies:    Allergic Reactions (Selected)  No Known Allergies.   Medications:  (Selected)   Inpatient Medications  Ordered  Heparin Flush 100 U/mL - 5 mL: 500 units, form: Injection, IV Push, Once-chemo, first dose 06/08/18 11:27:00 CDT, stop date 06/08/18 11:27:00 CDT, Week 3  Heparin Flush 100 U/mL - 5 mL: 500 units, form: Injection, IV Push, Once-chemo, first dose 06/29/18 11:20:00 CDT, stop date 06/29/18 11:20:00 CDT, Routine, Week 6  Zarxio: 300 mcg, form: Soln, Subcutaneous, Once-chemo, first dose 06/09/18 8:00:00 CDT, stop date 06/09/18 8:00:00 CDT, Diagnosis: Drug induced neutropenia, Days 1  Prescriptions  Prescribed  Eliquis 5 mg oral tablet: 5 mg = 1 tab(s), Oral, BID, # 60 tab(s), 0 Refill(s)  Megace 40 mg/mL oral suspension: 500 mg = 12.5 mL, Oral, Daily, # 375 mL, 0 Refill(s), Pharmacy: [facility]  Documented Medications  Documented  AMLODIPINE BESYLATE 5 MG TAB: 5 mg = 1 tab(s), Oral, Daily  Percocet 5/325: 1 tab(s), Oral, q4hr, PRN PRN pain, 0 Refill(s)  Vitamin B Complex oral capsule: 1 tab(s), Oral, Daily, 0 Refill(s)  Vitamin B6 100 mg oral tablet: 100 mg = 1 tab(s), Oral, Daily, 0 Refill(s)  glutamine oral powder for reconstitution: 10 gms, Oral, BID, 0  Refill(s)  ondansetron 8 mg oral tablet: See Instructions, PRN PRN as needed for nausea/vomiting, 1 tab(s) Oral in AM for 2 days after chemotherapy and q8hrs PRn nausea, # 30, 2 Refill(s).      Past Medical/ Family/ Social History   Medical history:    Active  Prostate cancer ([id number])  Resolved  Hypertension ([id number]):  Resolved.  Tobacco abuse ([id number]):  Resolved..   Surgical history:    Bleeder Control Gastrointestinal on 5/8/2018 at 77 Years.  Comments:  5/8/2018 13:Stephanie Boucher RN  auto-populated from documented surgical case  Polypectomy on 5/8/2018 at 77 Years.  Comments:  5/8/2018 13:Stephanie Boucher RN  auto-populated from documented surgical case  Esophagogastroduodenoscopy on 5/8/2018 at 77 Years.  Comments:  5/8/2018 13:Stephanie Boucher RN  auto-populated from documented surgical case  Colonoscopy on 5/8/2018 at 77 Years.  Comments:  5/8/2018 13:Stephanie Boucher RN  auto-populated from documented surgical case  no previous surgeries.  Prostatectomy..   Family history:    Entire family history is negative..   Social history:    Social & Psychosocial Habits    Alcohol  03/08/2013  Use: Past    Type: Liquor    Comment: quit long time ago - 03/08/2013 06:38 Lauren Cantor RN    Tobacco  03/08/2013  Use: Current    Type: Cigars    Comment: 1 pack daily - 03/08/2013 06:38 Lauren Cantor RN    03/08/2013 Risk Assessment: High Risk    07/07/2018  Use: Current every day smoker  .      Physical Examination               Vital Signs   Vital Signs   9/29/2018 21:30 CDT      Peripheral Pulse Rate     101 bpm  HI                             Heart Rate Monitored      102 bpm  HI                             Respiratory Rate          19 br/min                             SpO2                      98 %                             Oxygen Therapy            Room air                             Systolic Blood Pressure   115 mmHg                              Diastolic Blood Pressure  82 mmHg                             Mean Arterial Pressure, Cuff              93 mmHg    9/29/2018 21:00 CDT      Peripheral Pulse Rate     108 bpm  HI                             Heart Rate Monitored      108 bpm  HI                             Respiratory Rate          20 br/min                             SpO2                      98 %                             Oxygen Therapy            Room air                             Systolic Blood Pressure   120 mmHg                             Diastolic Blood Pressure  77 mmHg                             Mean Arterial Pressure, Cuff              91 mmHg  .   Measurements   9/29/2018 19:19 CDT      Weight Dosing             40 kg                             Weight Measured and Calculated in Lbs     88.18 lb                             Weight Estimated          40 kg                             Height/Length Dosing      160 cm                             Height/Length Estimated   160 cm                             Body Mass Index Estimated 15.63 kg/m2  .   Basic Oxygen Information   9/29/2018 21:30 CDT      SpO2                      98 %                             Oxygen Therapy            Room air    9/29/2018 21:00 CDT      SpO2                      98 %                             Oxygen Therapy            Room air  .   General:  Alert, no acute distress, Cachetic.    Skin:  Warm, dry, intact.    Head:  Normocephalic, atraumatic.    Eye   Cardiovascular:  Regular rate and rhythm, Normal peripheral perfusion, No edema.    Respiratory:  Lungs are clear to auscultation, respirations are non-labored, breath sounds are equal, Symmetrical chest wall expansion.    Gastrointestinal:  Soft, Nontender, Non distended, Normal bowel sounds, Guarding: Negative, Rebound: Negative.    Musculoskeletal:  No deformity, Swelling to the right arm with no erythema or induration, 2+ radial pulse noted .    Neurological:  Alert and oriented to  person, place, time, and situation, No focal neurological deficit observed, normal speech observed.    Psychiatric:  Cooperative, appropriate mood & affect.       Medical Decision Making   Rationale:  Heme/Onc recs lovenox.  attempting to arrange with pharmacy, if unable to arrange will require admission until available.   Documents reviewed:  Emergency department nurses' notes.   Orders  Launch Orders   Laboratory:  Sed Rate (Order): Stat collect, 9/29/2018 19:21 CDT, Blood, Lab Collect, Print Label By Order Location, 9/29/2018 19:21 CDT  C-Reactive Protein High Sensitivity (Order): Stat collect, 9/29/2018 19:21 CDT, Blood, Lab Collect, Print Label By Order Location, 9/29/2018 19:21 CDT  CMP (Order): Stat collect, 9/29/2018 19:20 CDT, Blood, Lab Collect, Print Label By Order Location, 9/29/2018 19:20 CDT  CBC w/ Auto Diff (Order): Stat collect, 9/29/2018 19:20 CDT, Blood, Lab Collect, Print Label By Order Location, 9/29/2018 19:20 CDT  Cardiology:  US NIVA Venous Upper Ext Right (Order): 9/29/2018 19:21 CDT, edema, Ambulatory, Patient has IV, Standard Precautions, Launch Orders   Laboratory:  PTT (Order): Stat collect, 9/29/2018 19:25 CDT, Blood, Lab Collect, Print Label By Order Location, 9/29/2018 19:25 CDT  INR - Protime (Order): Stat collect, 9/29/2018 19:25 CDT, Blood, Lab Collect, Print Label By Order Location, 9/29/2018 19:25 CDT.    Results review:  Lab results : Lab View   9/29/2018 19:33 CDT      Sodium Lvl                140 mmol/L                             Potassium Lvl             4.6 mmol/L                             Chloride                  105 mmol/L                             CO2                       28.0 mmol/L                             Calcium Lvl               9.4 mg/dL                             Glucose Lvl               145 mg/dL  HI                             BUN                       30.0 mg/dL  HI                             Creatinine                1.21 mg/dL                              eGFR-AA                   >60 mL/min/1.73 m2  NA                             eGFR-SOPHIA                  >60 mL/min/1.73 m2  NA                             Bili Total                0.3 mg/dL                             Bili Direct               0.10 mg/dL                             Bili Indirect             0.20 mg/dL                             AST                       19 unit/L                             ALT                       17 unit/L                             Alk Phos                  124 unit/L                             Total Protein             7.7 gm/dL                             Albumin Lvl               2.40 gm/dL  LOW                             Globulin                  5.30 gm/dL  HI                             A/G Ratio                 0.5  NA                             CRP High Sens             61.60 mg/L  HI                             PT                        15.9 second(s)  HI                             INR                       1.23                             PTT                       32.3 second(s)                             WBC                       7.3 x10(3)/mcL                             RBC                       3.90 x10(6)/mcL  LOW                             Hgb                       9.2 gm/dL  LOW                             Hct                       32.2 %  LOW                             Platelet                  256 x10(3)/mcL                             MCV                       82.6 fL                             MCH                       23.6 pg  LOW                             MCHC                      28.6 gm/dL  LOW                             RDW                       19.0 %  HI                             MPV                       9.2 fL  LOW                             Abs Neut                  5.79 x10(3)/mcL                             Neutro Auto               79 %  NA                             Lymph Auto                12 %  NA                              Mono Auto                 8 %  NA                             Eos Auto                  0 %  NA                             Abs Eos                   0.0 x10(3)/mcL                             Basophil Auto             0 %  NA                             Abs Neutro                5.79 x10(3)/mcL                             Abs Lymph                 0.9 x10(3)/mcL                             Abs Mono                  0.6 x10(3)/mcL                             Abs Baso                  0.0 x10(3)/mcL                             Sed Rate                  82 mm/hr  HI  .   Notes:  NIVA + acute DVT R axillary and subclavian vein.      Reexamination/ Reevaluation   Time: 9/30/2018 00:23:00 .   Assessment: pharmacist is unable to get his insurance company to cover the medication overnight and he is unable to afford the $500 fee which is what the pharmacy is reporting. will observe until approval for medication can be obtained.      Impression and Plan   Diagnosis   DVT of axillary vein, acute right ([id number].A11)      Calls-Consults   -  Richard Wheeler MD, phone call, on call for Dr Kaye, recs lovenox 1mg/kg subcutaneous and outpatient follow up if it can be arranged.    -  [facility] hospitalist service.   Plan   Disposition: Place in Observation Unit.    Counseled: Patient, Regarding diagnosis, Regarding diagnostic results, Regarding treatment plan, Patient indicated understanding of instructions, Family expressed understanding.    Notes: I, Maggy Rivero, acted solely as a scribe for and in the presence of Dr. Lamas who performed the service., I, Dr Lamas have read note from scribe and I agree with history and physical except as amended by me.  All information was dictated from my history and my examination of patient..

## 2022-04-30 NOTE — DISCHARGE SUMMARY
Patient:   Perry Dalton            MRN: [id number]            FIN: [id number]               Age:   78 years     Sex:  Male     :  1940   Associated Diagnoses:   None   Author:   Bakari Rojas MD      Discharge Information      Discharge Summary Information   Admit/Discharge Dates   Admit Date: 2018  Discharge Date: 10/02/2018     Procedures   No procedures recorded for this visit.      Physical Examination   General:  Alert and oriented, No acute distress.    Neck:  Supple, Non-tender.    Respiratory:  Lungs are clear to auscultation, Respirations are non-labored.    Cardiovascular:  Normal rate, Regular rhythm.    Gastrointestinal:  Soft, Non-tender.       Discharge Plan   Discharge Summary Plan   Discharge disposition: discharge to home.     Orders     Orders   Patient Care:  Give all scheduled vaccinations prior to discharge. (Order): 10/2/2018 8:09 CDT, Give all scheduled vaccinations prior to discharge.  Discontinue IV (Order): 10/2/2018 8:09 CDT  Pharmacy:  Eliquis 5 mg oral tablet (Prescribe): 5 mg = 1 tab(s), Oral, BID, please take this only after the first eliquis prescription is complete, X 30 day(s), # 60 tab(s), 2 Refill(s)  Eliquis 5 mg oral tablet (Prescribe): 10 mg = 2 tab(s), Oral, BID, X 7 day(s), # 28 tab(s), 0 Refill(s)  Protonix 40 mg ORAL enteric coated tablet (Prescribe): 40 mg = 1 tab(s), Oral, Daily, # 30 tab(s), 0 Refill(s)  Megace 40 mg/mL oral suspension (Discontinue): 10/2/2018 8:06 CDT  DOXYCYCLINE MONO 100 MG CAP (Discontinue): 10/2/2018 8:06 CDT  CYPROHEPTADINE 4 MG TABLET (Discontinue): 10/2/2018 8:06 CDT  LEVOFLOXACIN 500 MG TABLET (Discontinue): 10/2/2018 8:06 CDT  BENZONATATE 200 MG CAP (Discontinue): 10/2/2018 8:06 CDT  SOD POLYSTYREN SULF 15 G 60 (Discontinue): 10/2/2018 8:06 CDT  NYSTATIN 100,000 UNIT/ML SUSP (Discontinue): 10/2/2018 8:06 CDT  Eliquis 5 mg oral tablet (Discontinue): 10/2/2018 8:06 CDT  ondansetron 8 mg oral tablet (Discontinue):  10/2/2018 8:06 CDT  Vitamin B Complex oral capsule (Discontinue): 10/2/2018 8:06 CDT  Vitamin B6 100 mg oral tablet (Discontinue): 10/2/2018 8:06 CDT  glutamine oral powder for reconstitution (Discontinue): 10/2/2018 8:06 CDT  Percocet 5/325 (Discontinue): 10/2/2018 8:06 CDT  Admit/Transfer/Discharge:  Discharge Activity (Order): Exercise as Tolerated  Discharge (Order): 10/2/2018 8:09 CDT, Home, Give all scheduled vaccinations prior to discharge..        Education and Follow-up   Counseled: patient, family, regarding diagnosis, regarding treatment, regarding medications.     Discharge Planning: Follow-up, time spent on discharge disposition, including family discussions, nursing colleagues discussions, and/or resident discussions was 37 minutes. .

## 2022-04-30 NOTE — H&P
Patient:   Perry Dalton            MRN: [id number]            FIN: [id number]               Age:   78 years     Sex:  Male     :  1940   Associated Diagnoses:   Arm pain-swelling; DVT of axillary vein, acute right; Non-small cell carcinoma of right lung; Tobacco abuse; Malnutrition; Hypertension   Author:   Cathleen ENGLISH, Eleazar      Basic Information   Admit information:  A 78 year old male presented with c/o rue swelling and pain has a h/o non small cell ca of the lung with mets with a rue dvt axillary vein in march treated with eliquis and completed his chemo and radiation is extremely malnourished and socially dependent because of his current malignancy in view of his increasing dvt and pain along with swelling to the rue patient placed in obs for acute on chr dvt for lovenox to be arranged at home oncology consulted and patient placed in obs .    Source of history:  Self.    Referral source:  Self.    History limitation:  Clinical condition.       Chief Complaint   2018 19:19 CDT      acute onset swelling of R arm and hand, denies pain, cms intact. hx of lung cancer w/ mets not on treatments. hx of previous clot to R arm.  (Modified)       Review of Systems   Constitutional:  Weakness, Fatigue, Decreased activity, cachexia.    Eye:  Negative.    Ear/Nose/Mouth/Throat:  Negative.    Respiratory:  Shortness of breath, Cough.    Cardiovascular:  Negative.    Gastrointestinal:  Negative.    Genitourinary:  Negative.    Hematology/Lymphatics:  Negative.    Endocrine:  Negative.    Immunologic:  Immunocompromised, Recurrent infections, Malaise.    Musculoskeletal:  Decreased range of motion.    Neurologic:  Negative.       Health Status   Allergies:    Allergic Reactions (All)  No Known Allergies,    Allergies (1) Active Reaction  No Known Allergies None Documented     Current medications:  (Selected)   Inpatient Medications  Ordered  Eliquis 5 mg oral tablet: 5 mg, form: Tab, Oral, BID,  first dose 09/30/18 9:00:00 CDT  Heparin Flush 100 U/mL - 5 mL: 500 units, form: Injection, IV Push, Once-chemo, first dose 06/08/18 11:27:00 CDT, stop date 06/08/18 11:27:00 CDT, Week 3  Heparin Flush 100 U/mL - 5 mL: 500 units, form: Injection, IV Push, Once-chemo, first dose 06/29/18 11:20:00 CDT, stop date 06/29/18 11:20:00 CDT, Routine, Week 6  Lovenox: 40 mg, form: Injection, Subcutaneous, BID, first dose 09/30/18 9:00:00 CDT  Protonix 40 mg Vial (IV Push): 40 mg, IV Slow, Daily, first dose 09/30/18 6:00:00 CDT  Zarxio: 300 mcg, form: Soln, Subcutaneous, Once-chemo, first dose 06/09/18 8:00:00 CDT, stop date 06/09/18 8:00:00 CDT, Diagnosis: Drug induced neutropenia, Days 1  amlodipine 5 mg oral tablet: 5 mg, form: Tab, Oral, Daily, first dose 09/30/18 9:00:00 CDT  hydrALAZINE (Apresoline) Inj.: 5 mg, form: Injection, IV Push, q2hr PRN for hypertension, first dose 09/30/18 1:22:00 CDT  labetalol 5 mg/mL intravenous solution: 10 mg, form: Soln, IV Push, q2hr PRN for hypertension, first dose 09/30/18 1:22:00 CDT  ondansetron: See Instructions, form: Tab, Oral, TID PRN for nausea/vomiting, first dose 09/30/18 7:23:00 CDT  prednisONE 10 mg oral tablet: 10 mg, form: Tab, Oral, BID, first dose 09/30/18 9:00:00 CDT  Prescriptions  Prescribed  Eliquis 5 mg oral tablet: 5 mg = 1 tab(s), Oral, BID, # 60 tab(s), 0 Refill(s)  Megace 40 mg/mL oral suspension: 500 mg = 12.5 mL, Oral, Daily, # 375 mL, 0 Refill(s), Pharmacy: [facility]  Documented Medications  Documented  ADVAIR 250-50 DISKUS: 1 puff(s), INH, BID  AMLODIPINE BESYLATE 5 MG TAB: 5 mg = 1 tab(s), Oral, Daily  BENZONATATE 200 MG CAP: 200 mg = 1 cap(s), Oral, TID  CYCLOBENZAPRINE 10 MG TAB: 10 mg = 1 tab(s), Oral, qPM  CYPROHEPTADINE 4 MG TABLET:   DOXYCYCLINE MONO 100 MG CAP: 100 mg = 1 cap(s), Oral, BID  LEVOFLOXACIN 500 MG TABLET: 500 mg = 1 tab(s), Oral, Daily  NYSTATIN 100,000 UNIT/ML SUSP:   PREDNISONE 10 MG TABLET: 10 mg = 1 tab(s), Oral, BID  Percocet  5/325: 1 tab(s), Oral, q4hr, PRN PRN pain, 0 Refill(s)  SOD POLYSTYREN SULF 15 G 60:   Vitamin B Complex oral capsule: 1 tab(s), Oral, Daily, 0 Refill(s)  Vitamin B6 100 mg oral tablet: 100 mg = 1 tab(s), Oral, Daily, 0 Refill(s)  glutamine oral powder for reconstitution: 10 gms, Oral, BID, 0 Refill(s)  ondansetron 8 mg oral tablet: See Instructions, PRN PRN as needed for nausea/vomiting, 1 tab(s) Oral in AM for 2 days after chemotherapy and q8hrs PRn nausea, # 30, 2 Refill(s),    Medications (8) Active  Scheduled: (5)  amlodipine 5 mg Tab UD  5 mg 1 tab(s), Oral, Daily  apixaban 5 mg Tab  5 mg 1 tab(s), Oral, BID  enoxaparin 40mg/0.4ml Inj  40 mg 0.4 mL, Subcutaneous, BID  pantoprazole 40 mg Inj  40 mg 1 EA, IV Slow, Daily  prednisone 10 mg Tab UD  10 mg 1 tab(s), Oral, BID  Continuous: (0)  PRN: (3)  hydrALAzine 20 mg/ml Inj- 1 mL  5 mg 0.25 mL, IV Push, q2hr  labetalol 5 mg/mL 20mL vial  10 mg 2 mL, IV Push, q2hr  ondansetron  See Instructions, Oral, TID     Problem list:    All Problems  Malnutrition / SNOMED CT 921682244 / Confirmed  Non-small cell carcinoma of right lung / SNOMED CT 541741632 / Confirmed  Primary cancer of right upper lobe of lung / SNOMED CT 1945340016 / Confirmed  Prostate cancer / SNOMED CT [id number] / Confirmed  Review of care plan / SNOMED CT 5277121324 / Confirmed  Bone metastases / SNOMED CT 201988618 / Confirmed  Tobacco user / SNOMED CT 761563822 / Confirmed,    Active Problems (7)  Bone metastases   Malnutrition   Non-small cell carcinoma of right lung   Primary cancer of right upper lobe of lung   Prostate cancer   Review of care plan   Tobacco user         Histories   Past Medical History:    Active  Prostate cancer ([id number])  Resolved  Hypertension (78881387):  Resolved.  Tobacco abuse ([id number]):  Resolved.   Family History:    Entire family history is negative., ns   Procedure history:    Bleeder Control Gastrointestinal  on 5/8/2018 at 77 Years.  Comments:  5/8/2018 13:52 - Karsten BURGESS, Stephanie Araujo  auto-populated from documented surgical case  Polypectomy on 5/8/2018 at 77 Years.  Comments:  5/8/2018 13:52 - Stephanie Shelley RN  auto-populated from documented surgical case  Esophagogastroduodenoscopy on 5/8/2018 at 77 Years.  Comments:  5/8/2018 13:52 - Stephanie Shelley RN  auto-populated from documented surgical case  Colonoscopy on 5/8/2018 at 77 Years.  Comments:  5/8/2018 13:52 - Karsten BURGESS, Stephanie Araujo  auto-populated from documented surgical case  no previous surgeries.  Prostatectomy.   Social History        Social & Psychosocial Habits    Alcohol  03/08/2013  Use: Past    Type: Liquor    Comment: quit long time ago - 03/08/2013 06:38 - Lauren Griffin RN    Substance Abuse    Comment: denies - 09/29/2018 19:21 - Papo BURGESS, León TAPIA    Tobacco  03/08/2013 Risk Assessment: High Risk    09/29/2018  Use: Former smoker    Smoking Cessation Counseling N/A  .        Physical Examination   Intake and Output   Fluid Balance Primitives   5/10/2018 23:00 CDT      Oral Intake               500 mL                             Urine Count               4                             Stool Count               0    5/10/2018 15:00 CDT      Oral Intake               720 mL                             Urine Count               4                             Stool Count               0    5/10/2018 7:00 CDT       Oral Intake               960 mL                             Urine Count               4                             Stool Count               0        General:  No acute distress.    Eye:       Conjunctiva: Pale.    HENT:  Normocephalic.    Neck:  Supple, Non-tender.    Respiratory:  Lungs are clear to auscultation, Respirations are non-labored.    Cardiovascular:  Normal rate, Regular rhythm.    Gastrointestinal:  Soft, Non-tender, Non-distended.    Vital Signs   9/30/2018 4:23 CDT       Temperature Oral           36.6 DegC                             Temperature Oral (calculated)             97.88 DegF                             Peripheral Pulse Rate     98 bpm                             SpO2                      99 %                             Systolic Blood Pressure   109 mmHg                             Diastolic Blood Pressure  63 mmHg                             Mean Arterial Pressure, Cuff              78 mmHg    9/30/2018 4:00 CDT       Respiratory Rate          22 br/min    9/30/2018 2:54 CDT       Temperature Oral          36.3 DegC                             Temperature Oral (calculated)             97.34 DegF                             Peripheral Pulse Rate     115 bpm  HI                             Respiratory Rate          20 br/min                             SpO2                      99 %                             Oxygen Therapy            Room air                             Systolic Blood Pressure   119 mmHg                             Diastolic Blood Pressure  84 mmHg                             Mean Arterial Pressure, Cuff              96 mmHg    9/30/2018 2:09 CDT       Temperature Oral          36.3 DegC                             Temperature Oral (calculated)             97.34 DegF                             Peripheral Pulse Rate     108 bpm  HI                             SpO2                      98 %                             Systolic Blood Pressure   119 mmHg                             Diastolic Blood Pressure  84 mmHg                             Mean Arterial Pressure, Cuff              98 mmHg    9/30/2018 1:00 CDT       Peripheral Pulse Rate     109 bpm  HI                             Heart Rate Monitored      108 bpm  HI                             Respiratory Rate          27 br/min  HI                             SpO2                      95 %                             Oxygen Therapy            Room air                             Systolic Blood Pressure   126 mmHg                              Diastolic Blood Pressure  96 mmHg  HI                             Mean Arterial Pressure, Cuff              106 mmHg    9/30/2018 0:00 CDT       Peripheral Pulse Rate     111 bpm  HI                             Heart Rate Monitored      110 bpm  HI                             Respiratory Rate          20 br/min                             SpO2                      98 %                             Oxygen Therapy            Room air                             Systolic Blood Pressure   117 mmHg                             Diastolic Blood Pressure  83 mmHg                             Mean Arterial Pressure, Cuff              94 mmHg    9/29/2018 22:30 CDT      Peripheral Pulse Rate     99 bpm                             Heart Rate Monitored      97 bpm                             Respiratory Rate          22 br/min                             SpO2                      93 %  LOW                             Oxygen Therapy            Room air                             Systolic Blood Pressure   120 mmHg                             Diastolic Blood Pressure  84 mmHg                             Mean Arterial Pressure, Cuff              96 mmHg    9/29/2018 21:54 CDT      Temperature Temporal Artery               36.6 DegC                             Peripheral Pulse Rate     104 bpm  HI                             Heart Rate Monitored      100 bpm                             Respiratory Rate          24 br/min                             SpO2                      95 %                             Oxygen Therapy            Room air                             Systolic Blood Pressure   123 mmHg                             Diastolic Blood Pressure  87 mmHg                             Mean Arterial Pressure, Cuff              99 mmHg    9/29/2018 21:30 CDT      Peripheral Pulse Rate     101 bpm  HI                             Heart Rate Monitored      102 bpm  HI                             Respiratory Rate           19 br/min                             SpO2                      98 %                             Oxygen Therapy            Room air                             Systolic Blood Pressure   115 mmHg                             Diastolic Blood Pressure  82 mmHg                             Mean Arterial Pressure, Cuff              93 mmHg    9/29/2018 21:00 CDT      Peripheral Pulse Rate     108 bpm  HI                             Heart Rate Monitored      108 bpm  HI                             Respiratory Rate          20 br/min                             SpO2                      98 %                             Oxygen Therapy            Room air                             Systolic Blood Pressure   120 mmHg                             Diastolic Blood Pressure  77 mmHg                             Mean Arterial Pressure, Cuff              91 mmHg    9/29/2018 20:00 CDT      Temperature Temporal Artery               36.5 DegC                             Peripheral Pulse Rate     106 bpm  HI                             Heart Rate Monitored      105 bpm  HI                             Respiratory Rate          27 br/min  HI                             SpO2                      93 %  LOW                             Oxygen Therapy            Room air                             Systolic Blood Pressure   107 mmHg                             Diastolic Blood Pressure  71 mmHg                             Mean Arterial Pressure, Cuff              83 mmHg    9/29/2018 19:29 CDT      Heart Rate Monitored      117 bpm  HI                             Respiratory Rate          28 br/min  HI                             Oxygen Therapy            Room air                             Systolic Blood Pressure   115 mmHg                             Diastolic Blood Pressure  65 mmHg                             Mean Arterial Pressure, Cuff              82 mmHg    9/29/2018 19:19 CDT      Temperature Temporal Artery               36.5 DegC                              Peripheral Pulse Rate     92 bpm                             Respiratory Rate          15 br/min                             SpO2                      99 %                             Oxygen Therapy            Room air                             Systolic Blood Pressure   93 mmHg                             Diastolic Blood Pressure  64 mmHg        Vital Signs (last 24 hrs)_____  Last Charted___________  Temp Oral     36.6 DegC  (SEP 30 04:23)  Heart Rate Peripheral   98 bpm  (SEP 30 04:23)  Resp Rate         22 br/min  (SEP 30 04:00)  SBP      109 mmHg  (SEP 30 04:23)  DBP      63 mmHg  (SEP 30 04:23)  SpO2      99 %  (SEP 30 04:23)     Measurements from flowsheet : Measurements   9/30/2018 2:54 CDT       Weight Dosing             33.5 kg                             Weight Measured and Calculated in Lbs     73.85 lb                             Height/Length Dosing      160 cm    9/29/2018 19:19 CDT      Weight Dosing             40 kg                             Weight Measured and Calculated in Lbs     88.18 lb                             Weight Estimated          40 kg                             Height/Length Dosing      160 cm                             Height/Length Estimated   160 cm                             Body Mass Index Estimated 15.63 kg/m2     Genitourinary:  No costovertebral angle tenderness, No scrotal tenderness.    Musculoskeletal:       Upper extremity exam: Arm ( Right, Erythema, Swelling, Tenderness ).    Integumentary:  Warm.    Neurologic:  Alert.    Psychiatric:  Cooperative.       Health Maintenance      Health Maintenance     Pending (in the next year)        OverDue           Pneumococcal Vaccine due  03/08/13  and every 1  day(s)        Due            Aspirin Therapy for CVD Prevention due  09/30/18  and every 1  year(s)           Cognitive Screening due  09/30/18  and every 1  year(s)           Functional Assessment due  09/30/18  and every 1  year(s)            Geriatric Depression Screening due  09/30/18  and every 1  year(s)           Pneumococcal Vaccine due  09/30/18  and every            Tetanus Vaccine due  09/30/18  and every 10  year(s)           Zoster Vaccine due  09/30/18  and every 100  year(s)        Due In Future            Smoking Cessation not due until  07/07/19  and every 1  year(s)           Hypertension Management-BMP not due until  08/20/19  and every 1  year(s)           Advance Directive not due until  08/23/19  and every 1  year(s)           Obesity Screening not due until  08/23/19  and every 1  year(s)           Hypertension Management-Blood Pressure not due until  09/04/19  and every 1  year(s)     Satisfied (in the past 1 year)        Satisfied            ADL Screening on  09/30/18.  Satisfied by Lyla Jurado RN           Advance Directive on  08/23/18.  Satisfied by Jodie Cook LPN           Blood Pressure Screening on  09/30/18.  Satisfied by Maylin Cedillo CNA           Body Mass Index Check on  08/23/18.  Satisfied by Jodie Cook LPN           Colorectal Screening on  05/07/18.  Satisfied by Keri Mancuso           Depression Screening on  08/23/18.  Satisfied by Jodie Cook LPN           Diabetes Screening on  10/01/18.  Satisfied by Cathleen ENGLISH, Eleazar           Fall Risk Assessment on  09/30/18.  Satisfied by Lyla Jurado RN           Hypertension Management-BMP on  10/01/18.  Satisfied by Cathleen ENGLISH, Eleazar           Influenza Vaccine on  09/30/18.  Satisfied by Lyla Jurado RN           Lipid Screening on  03/07/18.  Satisfied by Atif Grace MD           Obesity Screening on  08/23/18.  Satisfied by Jodie Cook LPN           Smoking Cessation on  07/07/18.  Satisfied by Syed BURGESS, Maxine BARKER.          Review / Management   Results review:     Labs (Last four charted values)  WBC                  5.5 (SEP 30) 7.3 (SEP 29)   Hgb                  L 8.7 (SEP 30) L 9.2 (SEP 29)   Hct                   L 30.6 (SEP 30) L 32.2 (SEP 29)   Plt                  254 (SEP 30) 256 (SEP 29)   Na                   138 (SEP 30) 140 (SEP 29)   K                    4.8 (SEP 30) 4.6 (SEP 29)   CO2                  28.0 (SEP 30) 28.0 (SEP 29)   Cl                   105 (SEP 30) 105 (SEP 29)   Cr                   0.80 (SEP 30) 1.21 (SEP 29)   BUN                  H 26.0 (SEP 30) H 30.0 (SEP 29)   Glucose Random       L 66 (SEP 30) H 145 (SEP 29)   PT                   H 15.9 (SEP 29)   INR                  1.23 (SEP 29)   PTT                  32.3 (SEP 29) .    Laboratory Results   Today's Lab Results : PowerNote Discrete Results   9/30/2018 6:14 CDT       WBC                       5.5 x10(3)/mcL                             RBC                       3.67 x10(6)/mcL  LOW                             Hgb                       8.7 gm/dL  LOW                             Hct                       30.6 %  LOW                             Platelet                  254 x10(3)/mcL                             MCV                       83.4 fL                             MCH                       23.7 pg  LOW                             MCHC                      28.4 gm/dL  LOW                             RDW                       18.9 %  HI                             MPV                       9.5 fL                             Abs Neut                  4.43 x10(3)/mcL                             Neutro Auto               81 %  NA                             Lymph Auto                9 %  NA                             Mono Auto                 9 %  NA                             Eos Auto                  0 %  NA                             Abs Eos                   0.0 x10(3)/mcL                             Basophil Auto             0 %  NA                             Abs Neutro                4.43 x10(3)/mcL                             Abs Lymph                 0.5 x10(3)/mcL  LOW                             Abs Mono                   0.5 x10(3)/mcL                             Abs Baso                  0.0 x10(3)/mcL                             Sodium Lvl                138 mmol/L                             Potassium Lvl             4.8 mmol/L                             Chloride                  105 mmol/L                             CO2                       28.0 mmol/L                             Calcium Lvl               9.3 mg/dL                             Glucose Lvl               66 mg/dL  LOW                             BUN                       26.0 mg/dL  HI                             Creatinine                0.80 mg/dL                             eGFR-AA                   >60 mL/min/1.73 m2  NA                             eGFR-SOPHIA                  >60 mL/min/1.73 m2  NA                             Bili Total                0.3 mg/dL                             Bili Direct               0.10 mg/dL                             Bili Indirect             0.20 mg/dL                             AST                       24 unit/L                             ALT                       17 unit/L                             Alk Phos                  115 unit/L                             Total Protein             6.8 gm/dL                             Albumin Lvl               2.20 gm/dL  LOW                             Globulin                  4.60 gm/dL  HI                             A/G Ratio                 0.5 ratio  LOW    9/29/2018 19:33 CDT      WBC                       7.3 x10(3)/mcL                             RBC                       3.90 x10(6)/mcL  LOW                             Hgb                       9.2 gm/dL  LOW                             Hct                       32.2 %  LOW                             Platelet                  256 x10(3)/mcL                             MCV                       82.6 fL                             MCH                       23.6 pg  LOW                             MCHC                       28.6 gm/dL  LOW                             RDW                       19.0 %  HI                             MPV                       9.2 fL  LOW                             Abs Neut                  5.79 x10(3)/mcL                             Neutro Auto               79 %  NA                             Lymph Auto                12 %  NA                             Mono Auto                 8 %  NA                             Eos Auto                  0 %  NA                             Abs Eos                   0.0 x10(3)/mcL                             Basophil Auto             0 %  NA                             Abs Neutro                5.79 x10(3)/mcL                             Abs Lymph                 0.9 x10(3)/mcL                             Abs Mono                  0.6 x10(3)/mcL                             Abs Baso                  0.0 x10(3)/mcL                             Sed Rate                  82 mm/hr  HI                             PT                        15.9 second(s)  HI                             INR                       1.23                             PTT                       32.3 second(s)                             Sodium Lvl                140 mmol/L                             Potassium Lvl             4.6 mmol/L                             Chloride                  105 mmol/L                             CO2                       28.0 mmol/L                             Calcium Lvl               9.4 mg/dL                             Glucose Lvl               145 mg/dL  HI                             BUN                       30.0 mg/dL  HI                             Creatinine                1.21 mg/dL                             eGFR-AA                   >60 mL/min/1.73 m2  NA                             eGFR-SOPHIA                  >60 mL/min/1.73 m2  NA                             Bili Total                0.3 mg/dL                             Bili Direct                0.10 mg/dL                             Bili Indirect             0.20 mg/dL                             AST                       19 unit/L                             ALT                       17 unit/L                             Alk Phos                  124 unit/L                             Total Protein             7.7 gm/dL                             Albumin Lvl               2.40 gm/dL  LOW                             Globulin                  5.30 gm/dL  HI                             A/G Ratio                 0.5  NA                             CRP High Sens             61.60 mg/L  HI        Radiology results   CT   Condition:  Guarded.       Impression and Plan   Diagnosis     Arm pain-swelling (PNED [id number]).     DVT of axillary vein, acute right (VKU33-KR I82.A11).     Non-small cell carcinoma of right lung (SCH54-GC C34.91).     Tobacco abuse (ZPZ01-MV F17.290).     Malnutrition (BTN54-NZ E63.9).     Hypertension (VFY89-SE I10).     Course:  Worsening.    Orders      (Selected)   Inpatient Orders  Ordered  Admission Assessment Adult: 09/30/18 1:11:58 CDT, Stop date 09/30/18 1:11:58 CDT  Admission History Adult: 09/30/18 1:11:58 CDT, Stop date 09/30/18 1:11:58 CDT  Admit to Outpatient Observation: 09/30/18 0:56:00 CDT, Cathleen ENGLISH, [facility], No  Apply SCDs: 09/30/18 1:23:21 CDT, Once, Stop date 09/30/18 1:23:21 CDT  Basic Admission Information Adult: 09/30/18 1:11:58 CDT, Stop date 09/30/18 1:11:58 CDT  Below the Knee Intermittent Pneumatic Compression Device: 09/30/18 1:21:00 CDT, Constant Order  Capacity Management Bed Request: 09/30/18 0:56:20 CDT, Medical Unit  Capacity Management Bed Request: 09/30/18 1:11:26 CDT, Medical Unit  Capacity Management Bed Request: 09/30/18 1:12:30 CDT, Medical Unit  Consult to Nutritionist Adult: 09/30/18 3:00:01 CDT  Diet Regular: 09/30/18 1:22:00 CDT, Start Meal: Next Meal  Discharge Planning Ongoing Assessment: 10/02/18  9:00:00 CDT, q3day  Eliquis 5 mg oral tablet: 5 mg, form: Tab, Oral, BID, first dose 09/30/18 9:00:00 CDT  Fall Risk Protocol: 09/29/18 21:36:21 CDT, Constant Order  Lovenox: 40 mg, form: Injection, Subcutaneous, BID, first dose 09/30/18 9:00:00 CDT  Possible SIRS: 09/29/18 20:12:52 CDT, Once  Protonix 40 mg Vial (IV Push): 40 mg, IV Slow, Daily, first dose 09/30/18 6:00:00 CDT  Remove Compression Device, Inspect skin, Reapply, and Document Assessment: 09/30/18 1:21:00 [facility], [facility]  Scd Pump  [id number]:   Vital Signs: 09/30/18 1:21:00 CDT, q4hr  Walker Rolling  [id number]: 1, Routine, 09/30/18 3:37:00 CDT  amlodipine 5 mg oral tablet: 5 mg, form: Tab, Oral, Daily, first dose 09/30/18 9:00:00 CDT  hydrALAZINE (Apresoline) Inj.: 5 mg, form: Injection, IV Push, q2hr PRN for hypertension, first dose 09/30/18 1:22:00 CDT  labetalol 5 mg/mL intravenous solution: 10 mg, form: Soln, IV Push, q2hr PRN for hypertension, first dose 09/30/18 1:22:00 CDT  ondansetron: See Instructions, form: Tab, Oral, TID PRN for nausea/vomiting, first dose 09/30/18 7:23:00 CDT  prednisONE 10 mg oral tablet: 10 mg, form: Tab, Oral, BID, first dose 09/30/18 9:00:00 CDT  Ordered (Scheduled)  BMP: Routine collect, 10/01/18 5:00:00 CDT, Blood, Stop date 10/01/18 5:00:00 CDT, Lab Collect, in AM, 10/01/18 5:00:00 CDT  CBC w/ Auto Diff: Routine collect, 10/01/18 5:00:00 CDT, Blood, Stop date 10/01/18 5:00:00 CDT, Lab Collect, 10/01/18 5:00:00 CDT  Magnesium Level: Routine collect, 10/01/18 5:00:00 CDT, Blood, Stop date 10/01/18 5:00:00 CDT, Lab Collect, 10/01/18 5:00:00 CDT  TSH: Routine collect, 10/01/18 5:00:00 CDT, Blood, Stop date 10/01/18 5:00:00 CDT, Lab Collect, 10/01/18 5:00:00 CDT.     Education and Follow-up:          Counseled: Patient, Regarding diagnosis, Regarding treatment, Regarding medications.         Discharge Planning: Plan to discharge ( To home ), Lung Cancer, Venous Thromboembolism.       Quality Measures